# Patient Record
Sex: MALE | Race: WHITE | NOT HISPANIC OR LATINO | Employment: FULL TIME | URBAN - METROPOLITAN AREA
[De-identification: names, ages, dates, MRNs, and addresses within clinical notes are randomized per-mention and may not be internally consistent; named-entity substitution may affect disease eponyms.]

---

## 2022-05-21 ENCOUNTER — OFFICE VISIT (OUTPATIENT)
Dept: URGENT CARE | Facility: URGENT CARE | Age: 47
End: 2022-05-21
Payer: COMMERCIAL

## 2022-05-21 VITALS
SYSTOLIC BLOOD PRESSURE: 140 MMHG | WEIGHT: 190 LBS | OXYGEN SATURATION: 99 % | RESPIRATION RATE: 18 BRPM | DIASTOLIC BLOOD PRESSURE: 90 MMHG | HEIGHT: 71 IN | BODY MASS INDEX: 26.6 KG/M2 | TEMPERATURE: 97.7 F | HEART RATE: 94 BPM

## 2022-05-21 DIAGNOSIS — H10.13 ALLERGIC CONJUNCTIVITIS, BILATERAL: ICD-10-CM

## 2022-05-21 DIAGNOSIS — J30.2 SEASONAL ALLERGIC RHINITIS, UNSPECIFIED TRIGGER: Primary | ICD-10-CM

## 2022-05-21 PROCEDURE — 99203 OFFICE O/P NEW LOW 30 MIN: CPT | Performed by: PHYSICIAN ASSISTANT

## 2022-05-21 RX ORDER — FLUTICASONE PROPIONATE 50 MCG
1 SPRAY, SUSPENSION (ML) NASAL 2 TIMES DAILY
Qty: 16 G | Refills: 3 | Status: SHIPPED | OUTPATIENT
Start: 2022-05-21

## 2022-05-21 ASSESSMENT — ENCOUNTER SYMPTOMS
RHINORRHEA: 1
COUGH: 0
EYE DISCHARGE: 1
FATIGUE: 0
EYE REDNESS: 1

## 2022-05-21 NOTE — PROGRESS NOTES
Assessment & Plan:        ICD-10-CM    1. Seasonal allergic rhinitis, unspecified trigger  J30.2 fluticasone (FLONASE) 50 MCG/ACT nasal spray   2. Allergic conjunctivitis, bilateral  H10.13 ketotifen (ZADITOR) 0.025 % ophthalmic solution         Plan/Clinical Decision Making:    Clear drainage during exam with erythema of conjunctiva with increased allergy symptoms.   Will treat with Zaditor eye drops and add Flonase. Continue with Claritin.     BP elevated.  Needs to monitor and follow up with PCP if remaining high.       At the end of the encounter, I discussed results, diagnosis, medications. Discussed red flags for immediate return to clinic/ER, as well as indications for follow up if no improvement. Patient understood and agreed to plan. Patient was stable for discharge.          Roxana Quintero PA-C on 5/21/2022 at 8:50 AM          Subjective:     HPI:    Jean is a 47 year old male who presents to clinic today for the following health issues:  Chief Complaint   Patient presents with     Urgent Care     Conjunctivitis     Pink eyes started yesterday, worsen today.     HPI    Pinkeye since Thursday, crusting last night.   Had irritation yesterday.   Clear discharge with occasional yellow drainage.   No known exposure to anything.   Having increased allergy symptoms.   Taking Claritin once a day.       History obtained from the patient.    Review of Systems   Constitutional: Negative for fatigue.   HENT: Positive for congestion and rhinorrhea.    Eyes: Positive for discharge and redness.   Respiratory: Negative for cough.        There is no problem list on file for this patient.       No past medical history on file.    Social History     Tobacco Use     Smoking status: Former Smoker     Smokeless tobacco: Never Used   Substance Use Topics     Alcohol use: Yes             Objective:     Vitals:    05/21/22 0818 05/21/22 0902   BP: (!) 148/83 (!) 140/90   Pulse: 94    Resp: 18    Temp: 97.7  F (36.5  C)   "  TempSrc: Oral    SpO2: 99%    Weight: 86.2 kg (190 lb)    Height: 1.803 m (5' 11\")          Physical Exam   EXAM:   Pleasant, alert, appropriate appearance. NAD.  Head Exam: Normocephalic, atraumatic.  Eye Exam:  Mild swelling of eyelids, clear drainage. Mildly red conjunctiva.   Ear Exam: TMs grey without bulging. Normal canals.  Normal pinna.  Nose Exam: Normal external nose.    OroPharynx Exam:  Moist mucous membranes. Erythema of throat with PND.   Neck/Thyroid Exam:  No LAD.      Results:  No results found for any visits on 05/21/22.    "

## 2022-11-22 ENCOUNTER — APPOINTMENT (OUTPATIENT)
Dept: CARDIOLOGY | Facility: CLINIC | Age: 47
End: 2022-11-22
Attending: EMERGENCY MEDICINE
Payer: COMMERCIAL

## 2022-11-22 ENCOUNTER — HOSPITAL ENCOUNTER (EMERGENCY)
Facility: CLINIC | Age: 47
Discharge: HOME | End: 2022-11-22
Attending: EMERGENCY MEDICINE | Admitting: EMERGENCY MEDICINE
Payer: COMMERCIAL

## 2022-11-22 VITALS
HEIGHT: 71 IN | TEMPERATURE: 98.6 F | OXYGEN SATURATION: 98 % | HEART RATE: 91 BPM | SYSTOLIC BLOOD PRESSURE: 129 MMHG | BODY MASS INDEX: 25.9 KG/M2 | DIASTOLIC BLOOD PRESSURE: 82 MMHG | WEIGHT: 185 LBS | RESPIRATION RATE: 12 BRPM

## 2022-11-22 DIAGNOSIS — R00.2 PALPITATIONS: ICD-10-CM

## 2022-11-22 LAB
ANION GAP SERPL CALCULATED.3IONS-SCNC: 4 MMOL/L (ref 3–14)
ATRIAL RATE - MUSE: 115 BPM
BASOPHILS # BLD AUTO: 0 10E3/UL (ref 0–0.2)
BASOPHILS NFR BLD AUTO: 0 %
BUN SERPL-MCNC: 13 MG/DL (ref 7–30)
CALCIUM SERPL-MCNC: 8.9 MG/DL (ref 8.5–10.1)
CHLORIDE BLD-SCNC: 104 MMOL/L (ref 94–109)
CO2 SERPL-SCNC: 29 MMOL/L (ref 20–32)
CREAT SERPL-MCNC: 0.82 MG/DL (ref 0.66–1.25)
D DIMER PPP FEU-MCNC: <0.27 UG/ML FEU (ref 0–0.5)
DIASTOLIC BLOOD PRESSURE - MUSE: NORMAL MMHG
EOSINOPHIL # BLD AUTO: 0.1 10E3/UL (ref 0–0.7)
EOSINOPHIL NFR BLD AUTO: 1 %
ERYTHROCYTE [DISTWIDTH] IN BLOOD BY AUTOMATED COUNT: 11.9 % (ref 10–15)
GFR SERPL CREATININE-BSD FRML MDRD: >90 ML/MIN/1.73M2
GLUCOSE BLD-MCNC: 131 MG/DL (ref 70–99)
HCT VFR BLD AUTO: 44.7 % (ref 40–53)
HGB BLD-MCNC: 15.5 G/DL (ref 13.3–17.7)
IMM GRANULOCYTES # BLD: 0 10E3/UL
IMM GRANULOCYTES NFR BLD: 0 %
INTERPRETATION ECG - MUSE: NORMAL
LYMPHOCYTES # BLD AUTO: 1.2 10E3/UL (ref 0.8–5.3)
LYMPHOCYTES NFR BLD AUTO: 12 %
MCH RBC QN AUTO: 31.1 PG (ref 26.5–33)
MCHC RBC AUTO-ENTMCNC: 34.7 G/DL (ref 31.5–36.5)
MCV RBC AUTO: 90 FL (ref 78–100)
MONOCYTES # BLD AUTO: 0.6 10E3/UL (ref 0–1.3)
MONOCYTES NFR BLD AUTO: 6 %
NEUTROPHILS # BLD AUTO: 7.7 10E3/UL (ref 1.6–8.3)
NEUTROPHILS NFR BLD AUTO: 81 %
NRBC # BLD AUTO: 0 10E3/UL
NRBC BLD AUTO-RTO: 0 /100
P AXIS - MUSE: 65 DEGREES
PLATELET # BLD AUTO: 168 10E3/UL (ref 150–450)
POTASSIUM BLD-SCNC: 3.9 MMOL/L (ref 3.4–5.3)
PR INTERVAL - MUSE: 146 MS
QRS DURATION - MUSE: 82 MS
QT - MUSE: 320 MS
QTC - MUSE: 442 MS
R AXIS - MUSE: 45 DEGREES
RBC # BLD AUTO: 4.98 10E6/UL (ref 4.4–5.9)
SODIUM SERPL-SCNC: 137 MMOL/L (ref 133–144)
SYSTOLIC BLOOD PRESSURE - MUSE: NORMAL MMHG
T AXIS - MUSE: 19 DEGREES
TROPONIN I SERPL HS-MCNC: 5 NG/L
TROPONIN I SERPL HS-MCNC: 6 NG/L
TSH SERPL DL<=0.005 MIU/L-ACNC: 1.66 MU/L (ref 0.4–4)
VENTRICULAR RATE- MUSE: 115 BPM
WBC # BLD AUTO: 9.5 10E3/UL (ref 4–11)

## 2022-11-22 PROCEDURE — 84484 ASSAY OF TROPONIN QUANT: CPT | Mod: 91 | Performed by: EMERGENCY MEDICINE

## 2022-11-22 PROCEDURE — 258N000003 HC RX IP 258 OP 636: Performed by: EMERGENCY MEDICINE

## 2022-11-22 PROCEDURE — 93244 EXT ECG>48HR<7D REV&INTERPJ: CPT | Performed by: EMERGENCY MEDICINE

## 2022-11-22 PROCEDURE — 85025 COMPLETE CBC W/AUTO DIFF WBC: CPT | Performed by: EMERGENCY MEDICINE

## 2022-11-22 PROCEDURE — 36415 COLL VENOUS BLD VENIPUNCTURE: CPT | Performed by: EMERGENCY MEDICINE

## 2022-11-22 PROCEDURE — 84484 ASSAY OF TROPONIN QUANT: CPT | Performed by: EMERGENCY MEDICINE

## 2022-11-22 PROCEDURE — 80048 BASIC METABOLIC PNL TOTAL CA: CPT | Performed by: EMERGENCY MEDICINE

## 2022-11-22 PROCEDURE — 96360 HYDRATION IV INFUSION INIT: CPT

## 2022-11-22 PROCEDURE — 93242 EXT ECG>48HR<7D RECORDING: CPT

## 2022-11-22 PROCEDURE — 99284 EMERGENCY DEPT VISIT MOD MDM: CPT | Mod: 25

## 2022-11-22 PROCEDURE — 85379 FIBRIN DEGRADATION QUANT: CPT | Performed by: EMERGENCY MEDICINE

## 2022-11-22 PROCEDURE — 84443 ASSAY THYROID STIM HORMONE: CPT | Performed by: EMERGENCY MEDICINE

## 2022-11-22 RX ORDER — SODIUM CHLORIDE 9 MG/ML
INJECTION, SOLUTION INTRAVENOUS CONTINUOUS
Status: DISCONTINUED | OUTPATIENT
Start: 2022-11-22 | End: 2022-11-22 | Stop reason: HOSPADM

## 2022-11-22 RX ADMIN — SODIUM CHLORIDE 1000 ML: 9 INJECTION, SOLUTION INTRAVENOUS at 08:48

## 2022-11-22 ASSESSMENT — ENCOUNTER SYMPTOMS
SHORTNESS OF BREATH: 0
PALPITATIONS: 1
LIGHT-HEADEDNESS: 0
FEVER: 0

## 2022-11-22 ASSESSMENT — ACTIVITIES OF DAILY LIVING (ADL): ADLS_ACUITY_SCORE: 35

## 2022-11-22 NOTE — ED PROVIDER NOTES
History   Chief Complaint:  Palpitations       The history is provided by the patient.      Jean Velez is a 47 year old male with history of hyperlipidemia who presents with intermittent palpitations and tachycardia beginning two days ago around 0700. These palpitations lasted for about 7 hours before completely resolving. Throughout this time, he measured his HR using an apple watch and found it to be fluctuating between . His apple watch would not always  his HR, as it was an irregular rhythm at times. He did not experience palpitations the following day, but woke up this morning with palpitations and found his HR to be in the 140s. Patient states that he has had palpitations before, but they typically only last about 30 seconds. He has not had any chest pain, chest pressure, or shortness of breath with these palpitations. No fevers, lightheadedness, or leg swelling. He denies any personal history of diabetes, HTN, atrial fibrillation, DVT/PE, or thyroid problems. Patient is not a smoker. He was exposed to his child with RSV about 2 weeks ago.     Review of Systems   Constitutional: Negative for fever.   Respiratory: Negative for shortness of breath.    Cardiovascular: Positive for palpitations. Negative for chest pain and leg swelling.   Neurological: Negative for light-headedness.   All other systems reviewed and are negative.    Allergies:  No Known Allergies    Medications:  The patient is not currently taking any prescribed medications.    Past Medical History:     Pre-HTN  Hyperlipidemia   Seasonal allergies    Past Surgical History:    Appendectomy  ENT surgery  Orthopedic surgery     Family History:    Mother: Breast cancer  Father: Lung cancer, diabetes   Brother: Brain aneurysm   Sister: Thyroid cancer    Social History:  The patient presents to the ED alone  Arrives via private vehicle    Physical Exam     Patient Vitals for the past 24 hrs:   BP Temp Temp src Pulse Resp SpO2 Height  "Weight   11/22/22 0900 122/89 -- -- 97 9 99 % -- --   11/22/22 0643 119/80 98.6  F (37  C) Oral (!) 122 20 99 % 1.803 m (5' 11\") 83.9 kg (185 lb)       Physical Exam  Physical Exam   General:  Sitting on bed  HENT:  No obvious trauma to head  Right Ear:  External ear normal.   Left Ear:  External ear normal.   Nose:  Nose normal.   Eyes:  Conjunctivae and EOM are normal. Pupils are equal, round, and reactive.   Neck: Normal range of motion. Neck supple. No tracheal deviation present.   CV:  Normal heart sounds. No murmur heard.  Pulm/Chest: Effort normal and breath sounds normal.   Abd: Soft. No distension. There is no tenderness. There is no rigidity, no rebound and no guarding.   M/S: Normal range of motion.   Neuro: Alert. GCS 15.  Skin: Skin is warm and dry. No rash noted. Not diaphoretic.   Psych: Normal mood and affect. Behavior is normal.     Emergency Department Course   ECG  ECG taken at 0961, ECG read at 0630  Sinus tachycardia  Minimal voltage criteria for LVH, may be normal variant (Sokolow-Woods)  Nonspecific ST and T wave abnormality   Abnormal ECG  Rate 125 bpm. KS interval 144 ms. QRS duration 82 ms. QT/QTc 412/594 ms. P-R-T axes 78 65 61.     ECG results from 11/22/22   EKG 12-lead, tracing only     Value    Systolic Blood Pressure     Diastolic Blood Pressure     Ventricular Rate 115    Atrial Rate 115    KS Interval 146    QRS Duration 82        QTc 442    P Axis 65    R AXIS 45    T Axis 19    Interpretation ECG      Sinus tachycardia  Minimal voltage criteria for LVH, may be normal variant ( Sokolow-Woods )  Borderline ECG  When compared with ECG of 22-NOV-2022 06:31, (unconfirmed)  No significant change was found  Confirmed by GENERATED REPORT, COMPUTER ([id number]),  GUTIERREZ LOPEZ ([id number]) on 11/22/2022 8:39:18 AM       The above EKGs were reviewed and show no evidence of Brugada syndrome, Cruz-Parkinson-White, hypertrophic cardiomyopathy or prolonged QTc syndrome.    Laboratory:  Labs " Ordered and Resulted from Time of ED Arrival to Time of ED Departure   BASIC METABOLIC PANEL - Abnormal       Result Value    Sodium 137      Potassium 3.9      Chloride 104      Carbon Dioxide (CO2) 29      Anion Gap 4      Urea Nitrogen 13      Creatinine 0.82      Calcium 8.9      Glucose 131 (*)     GFR Estimate >90     TROPONIN I - Normal    Troponin I High Sensitivity 6     TSH WITH FREE T4 REFLEX - Normal    TSH 1.66     D DIMER QUANTITATIVE - Normal    D-Dimer Quantitative <0.27     TROPONIN I - Normal    Troponin I High Sensitivity 5     CBC WITH PLATELETS AND DIFFERENTIAL    WBC Count 9.5      RBC Count 4.98      Hemoglobin 15.5      Hematocrit 44.7      MCV 90      MCH 31.1      MCHC 34.7      RDW 11.9      Platelet Count 168      % Neutrophils 81      % Lymphocytes 12      % Monocytes 6      % Eosinophils 1      % Basophils 0      % Immature Granulocytes 0      NRBCs per 100 WBC 0      Absolute Neutrophils 7.7      Absolute Lymphocytes 1.2      Absolute Monocytes 0.6      Absolute Eosinophils 0.1      Absolute Basophils 0.0      Absolute Immature Granulocytes 0.0      Absolute NRBCs 0.0          Emergency Department Course:   Reviewed:  I reviewed nursing notes, vitals, past medical history and Care Everywhere    Assessments:  0838 I obtained history and examined the patient as noted above.   0952 I rechecked the patient and explained findings.     Interventions:  0848 NS  1L  IV    Disposition:  The patient was discharged to home.     Impression & Plan   Medical Decision Making:  Jean Velez is a very pleasant 47 year old male who presents for evaluation of palpitations.  Initial ECG shows sinus tachycardia with a few very nonspecific ST changes and QT may have been prolonged so an EKG was repeated. The EKG was reviewed and shows no evidence of Brugada syndrome, Cruz-Parkinson-White, hypertrophic cardiomyopathy and specifically the repeat EKG did not show a prolonged QTc syndrome.  Delta troponin  is not elevated. A broad differential diagnosis was considered including SVT, Atrial fibrillation, ventricular arrhythmia, thyroid disease, acute electrolyte abnormality,  drugs/medications, caffeine intake or other stimulants, medication side effect, anemia, heart disease, PE, etc.  The workup and exam here in ED would indicate that supportive outpatient management is indicated.  TSH is negative so doubt thyroid storm.  D-dimer is negative so I doubt pulmonary embolism and I doubt PE as patient is not short of breath.  Doubt acute coronary syndrome given symptoms and exam and serial troponins are negative.  Will have them follow up with PCP.  An outpatient Zio patch was ordered.  I recommended he return with any heart rate greater than 140 bpm while at rest.  His heart rate did improve with the fluids as above.  His 1-year-old daughter was recently diagnosed with RSV and he reports having some URI symptoms.  No indication for testing and he agreed against testing for RSV since it would not change his clinical course.  The sinus tachycardia may be a physiologic response to his underlying URI symptoms.  Discussed reasons to return.    The treatment plan was discussed with the patient and they expressed understanding of this plan and consented to the plan.  In addition, the patient will return to the emergency department if their symptoms persist, worsen, if new symptoms arise or if there is any concern as other pathology may be present that is not evident at this time. They also understand the importance of close follow up in the clinic and if unable to do so will return to the emergency department for a reevaluation. All questions were answered.       Diagnosis:    ICD-10-CM    1. Palpitations  R00.2           Scribe Disclosure:  Kasandra GASTELUM, am serving as a scribe at 8:17 AM on 11/22/2022 to document services personally performed by Wilber Latham DO based on my observations and the provider's  statements to me.            Wilber Latham, DO  11/22/22 1059

## 2022-11-22 NOTE — ED TRIAGE NOTES
Patient here with palpitation since Sunday. He denies having chest pain and no shortness of breath     Triage Assessment     Row Name 11/22/22 0644       Triage Assessment (Adult)    Airway WDL WDL       Respiratory WDL    Respiratory WDL WDL       Skin Circulation/Temperature WDL    Skin Circulation/Temperature WDL WDL       Cardiac WDL    Cardiac WDL WDL       Peripheral/Neurovascular WDL    Peripheral Neurovascular WDL WDL       Cognitive/Neuro/Behavioral WDL    Cognitive/Neuro/Behavioral WDL WDL

## 2022-12-21 ENCOUNTER — TELEPHONE (OUTPATIENT)
Dept: EMERGENCY MEDICINE | Facility: CLINIC | Age: 47
End: 2022-12-21

## 2022-12-21 NOTE — TELEPHONE ENCOUNTER
[facility] result notification     Patient/parent Name  Jean    Reason for call  Patient requesting lab result    Lab Result  Zio Patch Monitor that was placed on at his ER visit 11/22/22  Current symptoms  Patient states that the report is generated per the receiving facility of the mailed Zio patch and that the report is waiting for the ordering doctor to go in and pull the report.   Relayed to patient that this RN is not sure how that process works and he would like a call back with instructions on how to have his report read. Please see telephone encounter 12/14/22 as well.     Will forward call to ED lab results  for assistance.    Linda Nugent, YAMILEX  [facility] [employer] [place]  Emergency Dept Lab Result RN  Ph# [phone]     Copy of Lab result

## 2022-12-22 NOTE — TELEPHONE ENCOUNTER
"RN contacted Jean.  RN reviewed discharge instructions.  There is a leadless EKG result but report states \"in process\"    Jean states he spoke with the company that did the EKG and they stated the report was sent to Dr Latham (ED Provider) and awaiting him to sign off on this.    Jean has contacted  ED but has been transferred.  He was encouraged to call the ED back or speak with his PCP.  His PCP maybe able to obtain this result.    Renard Sullivan RN  [employer] [facility]  Emergency Dept Lab Result RN  Ph# [phone]    "

## 2023-02-04 ENCOUNTER — HEALTH MAINTENANCE LETTER (OUTPATIENT)
Age: 48
End: 2023-02-04

## 2024-03-02 ENCOUNTER — HEALTH MAINTENANCE LETTER (OUTPATIENT)
Age: 49
End: 2024-03-02

## 2025-03-15 ENCOUNTER — HEALTH MAINTENANCE LETTER (OUTPATIENT)
Age: 50
End: 2025-03-15